# Patient Record
Sex: MALE | Race: WHITE | Employment: FULL TIME | URBAN - METROPOLITAN AREA
[De-identification: names, ages, dates, MRNs, and addresses within clinical notes are randomized per-mention and may not be internally consistent; named-entity substitution may affect disease eponyms.]

---

## 2017-03-16 PROBLEM — H52.229 REGULAR ASTIGMATISM: Status: ACTIVE | Noted: 2017-03-06

## 2017-03-16 PROBLEM — H52.00 HYPERMETROPIA: Status: ACTIVE | Noted: 2017-03-06

## 2017-10-30 ENCOUNTER — OFFICE VISIT (OUTPATIENT)
Dept: INTERNAL MEDICINE CLINIC | Age: 27
End: 2017-10-30

## 2017-10-30 VITALS
DIASTOLIC BLOOD PRESSURE: 82 MMHG | OXYGEN SATURATION: 99 % | SYSTOLIC BLOOD PRESSURE: 122 MMHG | BODY MASS INDEX: 22.9 KG/M2 | HEIGHT: 69 IN | HEART RATE: 103 BPM | TEMPERATURE: 98.3 F | RESPIRATION RATE: 12 BRPM | WEIGHT: 154.6 LBS

## 2017-10-30 DIAGNOSIS — F41.9 ANXIETY: ICD-10-CM

## 2017-10-30 DIAGNOSIS — H81.90 VESTIBULAR DIZZINESS: Primary | ICD-10-CM

## 2017-10-30 DIAGNOSIS — R11.0 NAUSEA: ICD-10-CM

## 2017-10-30 RX ORDER — ALPRAZOLAM 0.5 MG/1
0.5 TABLET ORAL
Qty: 20 TAB | Refills: 0 | Status: SHIPPED | OUTPATIENT
Start: 2017-10-30 | End: 2018-05-11 | Stop reason: SDUPTHER

## 2017-10-30 RX ORDER — MECLIZINE HYDROCHLORIDE 25 MG/1
TABLET ORAL
COMMUNITY
End: 2018-02-07 | Stop reason: ALTCHOICE

## 2017-10-30 RX ORDER — ONDANSETRON 4 MG/1
4 TABLET, ORALLY DISINTEGRATING ORAL
Qty: 20 TAB | Refills: 0 | Status: SHIPPED | OUTPATIENT
Start: 2017-10-30 | End: 2018-02-07 | Stop reason: ALTCHOICE

## 2017-10-30 NOTE — MR AVS SNAPSHOT
Visit Information Date & Time Provider Department Dept. Phone Encounter #  
 10/30/2017 11:40 AM Samy Miranda NP [facility] [address] [id number] Upcoming Health Maintenance Date Due INFLUENZA AGE 9 TO ADULT 8/1/2017 DTaP/Tdap/Td series (2 - Td) 7/27/2020 Allergies as of 10/30/2017  Review Complete On: 10/30/2017 By: Samy Miranda NP No Known Allergies Current Immunizations  Reviewed on 7/26/2010 Name Date HIB Vaccine 9/23/1993, 1/19/1993, 1992 Hepatitis B Vaccine 11/6/2007, 11/5/1998, 4/6/1998 Influenza Vaccine 9/26/2016, 11/11/2015 MMR Vaccine 10/3/1996, 9/23/1993 Meningococcal Vaccine 7/17/2010 OPV 10/3/1996 TD Vaccine 10/3/1996, 1/19/1993, 1992, 1992 TDAP Vaccine 7/27/2010 Not reviewed this visit You Were Diagnosed With   
  
 Codes Comments Vestibular dizziness    -  Primary ICD-10-CM: L67 ICD-9-CM: 386. 9 Anxiety     ICD-10-CM: F41.9 ICD-9-CM: 300.00 Vitals BP Pulse Temp Resp Height(growth percentile) Weight(growth percentile) (!) 133/91 (BP 1 Location: Left arm, BP Patient Position: Sitting) (!) 103 98.3 °F (36.8 °C) (Oral) 12 5' 9\" (1.753 m) 154 lb 9.6 oz (70.1 kg) SpO2 BMI Smoking Status 99% 22.83 kg/m2 Former Smoker BMI and BSA Data Body Mass Index Body Surface Area  
 22.83 kg/m 2 1.85 m 2 Preferred Pharmacy Pharmacy Name Phone [id number] [address] [facility], [address] [place] [address]  [phone] Your Updated Medication List  
  
   
This list is accurate as of: 10/30/17 12:20 PM.  Always use your most recent med list.  
  
  
  
  
 * ALPRAZolam 0.5 mg tablet Commonly known as:  Marito Pandya 1/2 TO 1 PILL ONCE A DAY BY MOUTH AS NEEDED FOR ANXIETY. NO ALCOHOL WITH THIS MEDICATION. * ALPRAZolam 0.5 mg tablet Commonly known as:  Dillon Ly Take 1 Tab by mouth three (3) times daily as needed for Anxiety. Max Daily Amount: 1.5 mg. Azelastine 0.15 % (205.5 mcg) nasal spray Commonly known as:  ASTEPRO  
2 Sprays by Both Nostrils route two (2) times a day. fluticasone 50 mcg/actuation nasal spray Commonly known as:  Euna Tomer 2 Sprays by Both Nostrils route daily. meclizine 25 mg tablet Commonly known as:  ANTIVERT Take  by mouth three (3) times daily as needed. ondansetron 4 mg disintegrating tablet Commonly known as:  ZOFRAN ODT Take 1 Tab by mouth every eight (8) hours as needed for Nausea. raNITIdine 300 mg tablet Commonly known as:  ZANTAC Take 1 Tab by mouth daily. For stomach acid * Notice: This list has 2 medication(s) that are the same as other medications prescribed for you. Read the directions carefully, and ask your doctor or other care provider to review them with you. Prescriptions Printed Refills ALPRAZolam (XANAX) 0.5 mg tablet 0 Sig: Take 1 Tab by mouth three (3) times daily as needed for Anxiety. Max Daily Amount: 1.5 mg.  
 Class: Print Route: Oral  
 ondansetron (ZOFRAN ODT) 4 mg disintegrating tablet 0 Sig: Take 1 Tab by mouth every eight (8) hours as needed for Nausea. Class: Print Route: Oral  
  
Patient Instructions Vertigo: Care Instructions Your Care Instructions Vertigo is the feeling that you or your surroundings are moving when there is no actual movement. It is often described as a feeling of spinning, whirling, falling, or tilting. Vertigo may make you vomit or feel nauseated. You may have trouble standing or walking and may lose your balance. Vertigo is often related to an inner ear problem, but it can have other more serious causes. If vertigo continues, you may need more tests to find its cause. Follow-up care is a key part of your treatment and safety.  Be sure to make and go to all appointments, and call your doctor if you are having problems. It's also a good idea to know your test results and keep a list of the medicines you take. How can you care for yourself at home? · Do not lie flat on your back. Prop yourself up slightly. This may reduce the spinning feeling. Keep your eyes open. · Move slowly so that you do not fall. · If your doctor recommends medicine, take it exactly as directed. · Do not drive while you are having vertigo. Certain exercises, called Templeton-Daroff exercises, can help decrease vertigo. To do Templeton-Daroff exercises: · Sit on the edge of a bed or sofa and quickly lie down on the side that causes the worst vertigo. Lie on your side with your ear down. · Stay in this position for at least 30 seconds or until the vertigo goes away. · Sit up. If this causes vertigo, wait for it to stop. · Repeat the procedure on the other side. · Repeat this 10 times. Do these exercises 2 times a day until the vertigo is gone. When should you call for help? Call 911 anytime you think you may need emergency care. For example, call if: 
? · You passed out (lost consciousness). ? · You have symptoms of a stroke. These may include: 
¨ Sudden numbness, tingling, weakness, or loss of movement in your face, arm, or leg, especially on only one side of your body. ¨ Sudden vision changes. ¨ Sudden trouble speaking. ¨ Sudden confusion or trouble understanding simple statements. ¨ Sudden problems with walking or balance. ¨ A sudden, severe headache that is different from past headaches. ?Call your doctor now or seek immediate medical care if: 
? · Vertigo occurs with a fever, a headache, or ringing in your ears. ? · You have new or increased nausea and vomiting. ? Watch closely for changes in your health, and be sure to contact your doctor if: 
? · Vertigo gets worse or happens more often. ? · Vertigo has not gotten better after 2 weeks. Where can you learn more? Go to http://charitoStirplate.iohan.info/. Enter H490 in the search box to learn more about \"Vertigo: Care Instructions. \" Current as of: May 12, 2017 Content Version: 11.4 © 2006-2017 [employer]. Care instructions adapted under license by [employer] (which disclaims liability or warranty for this information). If you have questions about a medical condition or this instruction, always ask your healthcare professional. [address] any warranty or liability for your use of this information. Vertigo: Exercises Your Care Instructions Here are some examples of typical rehabilitation exercises for your condition. Start each exercise slowly. Ease off the exercise if you start to have pain. Your doctor or physical therapist will tell you when you can start these exercises and which ones will work best for you. How to do the exercises Exercise 1 1. Stand with a chair in front of you and a wall behind you. If you begin to fall, you may use them for support. 2. Stand with your feet together and your arms at your sides. 3. Move your head up and down 10 times. Exercise 2 1. Move your head side to side 10 times. Exercise 3 1. Move your head diagonally up and down 10 times. Exercise 4 1. Move your head diagonally up and down 10 times on the other side. Follow-up care is a key part of your treatment and safety. Be sure to make and go to all appointments, and call your doctor if you are having problems. It's also a good idea to know your test results and keep a list of the medicines you take. Where can you learn more? Go to http://charitoStirplate.iohan.info/. Enter F349 in the search box to learn more about \"Vertigo: Exercises. \" Current as of: May 4, 2017 Content Version: 11.4 © 2006-2017 [employer].  Care instructions adapted under license by [address] (which disclaims liability or warranty for this information). If you have questions about a medical condition or this instruction, always ask your healthcare professional. [address] any warranty or liability for your use of this information. Introducing [facility]! Aly Vázquez introduces [facility] patient portal. Now you can access parts of your medical record, email your doctor's office, and request medication refills online. 1. In your internet browser, go to [email or web address] 2. Click on the First Time User? Click Here link in the Sign In box. You will see the New Member Sign Up page. 3. Enter your [facility] Access Code exactly as it appears below. You will not need to use this code after youve completed the sign-up process. If you do not sign up before the expiration date, you must request a new code. · [facility] Access Code: [email or web address] Expires: 1/28/2018 12:20 PM 
 
4. Enter the last four digits of your Social Security Number (xxxx) and Date of Birth (mm/dd/yyyy) as indicated and click Submit. You will be taken to the next sign-up page. 5. Create a [facility] ID. This will be your [facility] login ID and cannot be changed, so think of one that is secure and easy to remember. 6. Create a [facility] password. You can change your password at any time. 7. Enter your Password Reset Question and Answer. This can be used at a later time if you forget your password. 8. Enter your e-mail address. You will receive e-mail notification when new information is available in [address] 9. Click Sign Up. You can now view and download portions of your medical record. 10. Click the Download Summary menu link to download a portable copy of your medical information. If you have questions, please visit the Frequently Asked Questions section of the [facility] website.  Remember, [facility] is NOT to be used for urgent needs. For medical emergencies, dial 911. Now available from your iPhone and Android! Please provide this summary of care documentation to your next provider. Your primary care clinician is listed as Gulshan Mitchell. If you have any questions after today's visit, please call [phone].

## 2017-10-30 NOTE — PROGRESS NOTES
HISTORY OF PRESENT ILLNESS  Abdelrahman Chaidez is a 24 y.o. male. HPI  Presents with complaints of sudden onset of vertigo symptoms that began on 10/25 and significantly worsened over the next 48 hours to where he was vomiting on 10/27 and went to [facility] on 10/28 for same symptoms. Was diagnosed with vertigo and given Rx for Meclizine 25 mg to take once daily; has been taking this but continues to have some issues with vertigo and nausea and this is causing him to feel very anxious. Has not been driving since symptoms began. His girlfriend has brought him here today. Reports previous history of anxiety that seemed to be triggered by nausea and used Xanax in past which helped. Last refilled in 1/2016. Denies recent URI or illness.  profile was accessed online for Abdelrahman Chaidez and reviewed by me during this encounter. I did not see evidence of inappropriate or suspicious controlled substance prescription activity. Review of Systems   Constitutional: Negative for chills and fever. HENT: Negative for congestion, ear pain, sore throat and tinnitus. Respiratory: Negative for cough and shortness of breath. Cardiovascular: Negative for chest pain and palpitations. Gastrointestinal: Positive for nausea and vomiting. Negative for abdominal pain, constipation and diarrhea. Genitourinary: Negative for dysuria and frequency. Musculoskeletal: Negative for myalgias. Skin: Negative for rash. Neurological: Positive for dizziness. Negative for headaches. /82 (BP 1 Location: Left arm, BP Patient Position: Sitting)  Pulse (!) 103  Temp 98.3 °F (36.8 °C) (Oral)   Resp 12  Ht 5' 9\" (1.753 m)  Wt 154 lb 9.6 oz (70.1 kg)  SpO2 99%  BMI 22.83 kg/m2  Physical Exam   Constitutional: He is oriented to person, place, and time. He appears well-developed and well-nourished. HENT:   Head: Normocephalic and atraumatic.    Right Ear: External ear normal.   Left Ear: External ear normal. Nose: Nose normal.   Mouth/Throat: Oropharynx is clear and moist.   Eyes: Conjunctivae and EOM are normal. Pupils are equal, round, and reactive to light. Neck: Normal range of motion. Neck supple. No thyromegaly present. Cardiovascular: Normal rate and regular rhythm. Pulmonary/Chest: Effort normal and breath sounds normal.   Abdominal: Soft. Bowel sounds are normal. There is no tenderness. Musculoskeletal: Normal range of motion. Lymphadenopathy:     He has no cervical adenopathy. Neurological: He is alert and oriented to person, place, and time. No cranial nerve deficit. Coordination abnormal.   Abnormal Romberg   Psychiatric: His mood appears anxious. His speech is rapid and/or pressured. Nursing note and vitals reviewed. ASSESSMENT and PLAN  Diagnoses and all orders for this visit:    1. Vestibular dizziness -- self-limited course of illness discussed with patient; exercises given; can take Meclizine up to every 8 hours as needed. Cautioned regarding sedation. 2. Nausea  -     ondansetron (ZOFRAN ODT) 4 mg disintegrating tablet; Take 1 Tab by mouth every eight (8) hours as needed for Nausea. 3. Anxiety - cautioned regarding sedation with medication. Return to office if not improving.  -     ALPRAZolam (XANAX) 0.5 mg tablet; Take 1 Tab by mouth three (3) times daily as needed for Anxiety.  Max Daily Amount: 1.5 mg.      lab results and schedule of future lab studies reviewed with patient  reviewed diet, exercise and weight control  reviewed medications and side effects in detail

## 2017-10-30 NOTE — PATIENT INSTRUCTIONS
Vertigo: Care Instructions  Your Care Instructions    Vertigo is the feeling that you or your surroundings are moving when there is no actual movement. It is often described as a feeling of spinning, whirling, falling, or tilting. Vertigo may make you vomit or feel nauseated. You may have trouble standing or walking and may lose your balance. Vertigo is often related to an inner ear problem, but it can have other more serious causes. If vertigo continues, you may need more tests to find its cause. Follow-up care is a key part of your treatment and safety. Be sure to make and go to all appointments, and call your doctor if you are having problems. It's also a good idea to know your test results and keep a list of the medicines you take. How can you care for yourself at home? · Do not lie flat on your back. Prop yourself up slightly. This may reduce the spinning feeling. Keep your eyes open. · Move slowly so that you do not fall. · If your doctor recommends medicine, take it exactly as directed. · Do not drive while you are having vertigo. Certain exercises, called Templeton-Daroff exercises, can help decrease vertigo. To do Templeton-Daroff exercises:  · Sit on the edge of a bed or sofa and quickly lie down on the side that causes the worst vertigo. Lie on your side with your ear down. · Stay in this position for at least 30 seconds or until the vertigo goes away. · Sit up. If this causes vertigo, wait for it to stop. · Repeat the procedure on the other side. · Repeat this 10 times. Do these exercises 2 times a day until the vertigo is gone. When should you call for help? Call 911 anytime you think you may need emergency care. For example, call if:  ? · You passed out (lost consciousness). ? · You have symptoms of a stroke. These may include:  ¨ Sudden numbness, tingling, weakness, or loss of movement in your face, arm, or leg, especially on only one side of your body.   ¨ Sudden vision changes. ¨ Sudden trouble speaking. ¨ Sudden confusion or trouble understanding simple statements. ¨ Sudden problems with walking or balance. ¨ A sudden, severe headache that is different from past headaches. ?Call your doctor now or seek immediate medical care if:  ? · Vertigo occurs with a fever, a headache, or ringing in your ears. ? · You have new or increased nausea and vomiting. ? Watch closely for changes in your health, and be sure to contact your doctor if:  ? · Vertigo gets worse or happens more often. ? · Vertigo has not gotten better after 2 weeks. Where can you learn more? Go to http://charito-han.info/. Enter M668 in the search box to learn more about \"Vertigo: Care Instructions. \"  Current as of: May 12, 2017  Content Version: 11.4  © 3931-8965 [employer]. Care instructions adapted under license by "[employer]" (which disclaims liability or warranty for this information). If you have questions about a medical condition or this instruction, always ask your healthcare professional. [place] [address] any warranty or liability for your use of this information. Vertigo: Exercises  Your Care Instructions  Here are some examples of typical rehabilitation exercises for your condition. Start each exercise slowly. Ease off the exercise if you start to have pain. Your doctor or physical therapist will tell you when you can start these exercises and which ones will work best for you. How to do the exercises  Exercise 1    1. Stand with a chair in front of you and a wall behind you. If you begin to fall, you may use them for support. 2. Stand with your feet together and your arms at your sides. 3. Move your head up and down 10 times. Exercise 2    1. Move your head side to side 10 times. Exercise 3    1. Move your head diagonally up and down 10 times. Exercise 4    1.  Move your head diagonally up and down 10 times on the other side.  Follow-up care is a key part of your treatment and safety. Be sure to make and go to all appointments, and call your doctor if you are having problems. It's also a good idea to know your test results and keep a list of the medicines you take. Where can you learn more? Go to http://charito-han.info/. Enter F349 in the search box to learn more about \"Vertigo: Exercises. \"  Current as of: May 4, 2017  Content Version: 11.4  © 2196-9842 [employer]. Care instructions adapted under license by [employer] (which disclaims liability or warranty for this information). If you have questions about a medical condition or this instruction, always ask your healthcare professional. [address] any warranty or liability for your use of this information.

## 2018-02-07 ENCOUNTER — OFFICE VISIT (OUTPATIENT)
Dept: INTERNAL MEDICINE CLINIC | Age: 28
End: 2018-02-07

## 2018-02-07 VITALS
HEIGHT: 69 IN | SYSTOLIC BLOOD PRESSURE: 127 MMHG | DIASTOLIC BLOOD PRESSURE: 90 MMHG | RESPIRATION RATE: 12 BRPM | TEMPERATURE: 98.5 F | HEART RATE: 120 BPM | WEIGHT: 155 LBS | BODY MASS INDEX: 22.96 KG/M2

## 2018-02-07 DIAGNOSIS — H81.90 VESTIBULAR DIZZINESS: ICD-10-CM

## 2018-02-07 DIAGNOSIS — H69.92 EUSTACHIAN TUBE DISORDER, LEFT: Primary | ICD-10-CM

## 2018-02-07 PROBLEM — H18.609 KERATOCONUS: Status: ACTIVE | Noted: 2018-01-01

## 2018-02-07 RX ORDER — METHYLPREDNISOLONE 4 MG/1
TABLET ORAL
Qty: 1 DOSE PACK | Refills: 0 | Status: SHIPPED | OUTPATIENT
Start: 2018-02-07 | End: 2018-02-17

## 2018-02-07 RX ORDER — MECLIZINE HYDROCHLORIDE 25 MG/1
25 TABLET ORAL
Qty: 30 TAB | Refills: 0 | Status: SHIPPED | OUTPATIENT
Start: 2018-02-07 | End: 2019-12-03 | Stop reason: ALTCHOICE

## 2018-02-07 NOTE — PROGRESS NOTES
HISTORY OF PRESENT ILLNESS    Chief Complaint   Patient presents with    Ear Fullness       Patient states he feels unsteady and his left ear is clogged for about 10 days. Reports head cold 14 days ago which improved after standing. No pain in ear. .  Did see Celedanna Reynolds in Oct 2017 and was given flonase, meclizine for vestibular dizziness. He took meclizine and s/s improved in 2 weeks then  Now, reports dizziness when inside, but not outside  Hears crackles in left ear when he blow his nose now  Able to run. Had a really stiff neck when he woke up along w his congestion 14 days ago  Went to [place].  States imaging was NL. This is resolved. 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 has a past surgical history that includes hx tonsillectomy. reports that he has quit smoking. He has never used smokeless tobacco.  family history is not on file. Physical Exam   Nursing note and vitals reviewed. Blood pressure 127/90, pulse (!) 120, temperature 98.5 °F (36.9 °C), temperature source Oral, resp. rate 12, height 5' 9\" (1.753 m), weight 155 lb (70.3 kg). Constitutional:  No distress. Eyes: Conjunctivae are normal.   Ears:  Hearing grossly intact  Cardiovascular: Normal rate. regular rhythm, no murmurs or gallops  No edema  Pulmonary/Chest: Effort normal.   CTAB  Musculoskeletal: moves all 4 extremities   Neurological: Alert and oriented to person, place, and time. Skin: No rash noted. Psychiatric: Normal mood and affect. Behavior is normal.     ASSESSMENT and PLAN  Diagnoses and all orders for this visit:    1.  Eustachian tube disorder, left  -     methylPREDNISolone (MEDROL DOSEPACK) 4 mg tablet; USE AS DIRECTED ON PACKAGE  -     meclizine (ANTIVERT) 25 mg tablet; Take 1 Tab by mouth three (3) times daily as needed. The patient is reassured that these symptoms do not appear to represent a serious or threatening condition. Cont flonase  Can add allegra or claritin    2. Vestibular dizziness  -     meclizine (ANTIVERT) 25 mg tablet; Take 1 Tab by mouth three (3) times daily as needed. lab results and schedule of future lab studies reviewed with patient  reviewed medications and side effects in detail    Return to clinic for further evaluation if new symptoms develop    Follow-up Disposition: Not on File    Current Outpatient Prescriptions   Medication Sig    methylPREDNISolone (MEDROL DOSEPACK) 4 mg tablet USE AS DIRECTED ON PACKAGE    meclizine (ANTIVERT) 25 mg tablet Take 1 Tab by mouth three (3) times daily as needed.  ALPRAZolam (XANAX) 0.5 mg tablet Take 1 Tab by mouth three (3) times daily as needed for Anxiety. Max Daily Amount: 1.5 mg.    fluticasone (FLONASE) 50 mcg/actuation nasal spray 2 Sprays by Both Nostrils route daily. No current facility-administered medications for this visit.

## 2018-02-07 NOTE — MR AVS SNAPSHOT
[address] [place] [address] 
 
 
 [address] [place] [address] [place] [address] [facility] 
[phone] Patient: Celsa Delatorre MRN: [id number] Visit Information Date & Time Provider Department Dept. Phone Encounter #  
 2/7/2018  8:30 AM Preethi Escudero MD [facility] [facility] [id number] Upcoming Health Maintenance Date Due DTaP/Tdap/Td series (2 - Td) 7/27/2020 Allergies as of 2/7/2018  Review Complete On: 2/7/2018 By: Preethi Escudero MD  
 No Known Allergies Current Immunizations  Reviewed on 7/26/2010 Name Date HIB Vaccine 9/23/1993, 1/19/1993, 1992 Hepatitis B Vaccine 11/6/2007, 11/5/1998, 4/6/1998 Influenza Vaccine 9/26/2016, 11/11/2015 MMR Vaccine 10/3/1996, 9/23/1993 Meningococcal Vaccine 7/17/2010 OPV 10/3/1996 TD Vaccine 10/3/1996, 1/19/1993, 1992, 1992 TDAP Vaccine 7/27/2010 Not reviewed this visit You Were Diagnosed With   
  
 Codes Comments Eustachian tube disorder, left    -  Primary ICD-10-CM: H69.92 
ICD-9-CM: 381. 9 Vestibular dizziness     ICD-10-CM: F99 ICD-9-CM: 386. 9 Vitals BP Pulse Temp Resp Height(growth percentile) Weight(growth percentile) 127/90 (BP 1 Location: Left arm, BP Patient Position: Sitting) (!) 120 98.5 °F (36.9 °C) (Oral) 12 5' 9\" (1.753 m) 155 lb (70.3 kg) BMI Smoking Status 22.89 kg/m2 Former Smoker Vitals History BMI and BSA Data Body Mass Index Body Surface Area  
 22.89 kg/m 2 1.85 m 2 Preferred Pharmacy Pharmacy Name Phone [id number] [address], [address] [place] [address]  [phone] Your Updated Medication List  
  
   
This list is accurate as of: 2/7/18  9:16 AM.  Always use your most recent med list.  
  
  
  
  
 ALPRAZolam 0.5 mg tablet Commonly known as:  Lugenia Mohs Take 1 Tab by mouth three (3) times daily as needed for Anxiety.  Max Daily Amount: 1.5 mg.  
 fluticasone 50 mcg/actuation nasal spray Commonly known as:  Diane Finders 2 Sprays by Both Nostrils route daily. meclizine 25 mg tablet Commonly known as:  ANTIVERT Take 1 Tab by mouth three (3) times daily as needed. methylPREDNISolone 4 mg tablet Commonly known as:  Nash Sweet USE AS DIRECTED ON PACKAGE Prescriptions Sent to Pharmacy Refills  
 methylPREDNISolone (MEDROL DOSEPACK) 4 mg tablet 0 Sig: USE AS DIRECTED ON PACKAGE Class: Normal  
 Pharmacy: [facility]/pharmacy #[id number] Ph #: [phone]  
 meclizine (ANTIVERT) 25 mg tablet 0 Sig: Take 1 Tab by mouth three (3) times daily as needed. Class: Normal  
 Pharmacy: [facility]/pharmacy #[id number]- [address] Ph #: [phone] Route: Oral  
  
Patient Instructions Eustachian Tube Problems: Care Instructions Your Care Instructions The eustachian (say \"you-STAY-shee-un\") tubes run between the inside of the ears and the throat. They keep air pressure stable in the ears. If your eustachian tubes become blocked, the air pressure in your ears changes. The fluids from a cold can clog eustachian tubes, causing pain in the ears. A quick change in air pressure can cause eustachian tubes to close up. This might happen when an airplane changes altitude or when a  goes up or down underwater. Eustachian tube problems often clear up on their own or after antibiotic treatment. If your tubes continue to be blocked, you may need surgery. Follow-up care is a key part of your treatment and safety. Be sure to make and go to all appointments, and call your doctor if you are having problems. It's also a good idea to know your test results and keep a list of the medicines you take. How can you care for yourself at home? · To ease ear pain, apply a warm washcloth or a heating pad set on low. There may be some drainage from the ear when the heat melts earwax. Put a cloth between the heat source and your skin. Do not use a heating pad with children. · If your doctor prescribed antibiotics, take them as directed. Do not stop taking them just because you feel better. You need to take the full course of antibiotics. · Your doctor may recommend over-the-counter medicine. Be safe with medicines. Oral or nasal decongestants may relieve ear pain. Avoid decongestants that are combined with antihistamines, which tend to cause more blockage. But if allergies seem to be the problem, your doctor may recommend a combination. Be careful with cough and cold medicines. Don't give them to children younger than 6, because they don't work for children that age and can even be harmful. For children 6 and older, always follow all the instructions carefully. Make sure you know how much medicine to give and how long to use it. And use the dosing device if one is included. When should you call for help? Call your doctor now or seek immediate medical care if: 
? · You develop sudden, complete hearing loss. ? · You have severe pain or feel dizzy. ? · You have new or increasing pus or blood draining from your ear. ? · You have redness, swelling, or pain around or behind the ear. ? Watch closely for changes in your health, and be sure to contact your doctor if: 
? · You do not get better after 2 weeks. ? · You have any new symptoms, such as itching or a feeling of fullness in the ear. Where can you learn more? Go to http://charito-han.info/. Enter Y822 in the search box to learn more about \"Eustachian Tube Problems: Care Instructions. \" Current as of: May 12, 2017 Content Version: 11.4 © 7421-7031 PixelFish.  Care instructions adapted under license by [employer] (which disclaims liability or warranty for this information). If you have questions about a medical condition or this instruction, always ask your healthcare professional. [address] any warranty or liability for your use of this information. Introducing [facility] SERVICES! Missael Choi introduces [employer] patient portal. Now you can access parts of your medical record, email your doctor's office, and request medication refills online. 1. In your internet browser, go to [email or web address] 2. Click on the First Time User? Click Here link in the Sign In box. You will see the New Member Sign Up page. 3. Enter your [employer] Access Code exactly as it appears below. You will not need to use this code after youve completed the sign-up process. If you do not sign up before the expiration date, you must request a new code. · [employer] Access Code: [email or web address] Expires: 5/8/2018  9:16 AM 
 
4. Enter the last four digits of your Social Security Number (xxxx) and Date of Birth (mm/dd/yyyy) as indicated and click Submit. You will be taken to the next sign-up page. 5. Create a [employer] ID. This will be your [employer] login ID and cannot be changed, so think of one that is secure and easy to remember. 6. Create a [employer] password. You can change your password at any time. 7. Enter your Password Reset Question and Answer. This can be used at a later time if you forget your password. 8. Enter your e-mail address. You will receive e-mail notification when new information is available in [address] 9. Click Sign Up. You can now view and download portions of your medical record. 10. Click the Download Summary menu link to download a portable copy of your medical information. If you have questions, please visit the Frequently Asked Questions section of the [employer] website. Remember, [employer] is NOT to be used for urgent needs. For medical emergencies, dial 911. Now available from your iPhone and Android! Please provide this summary of care documentation to your next provider. Your primary care clinician is listed as Wen Vitale. If you have any questions after today's visit, please call [phone].

## 2018-02-07 NOTE — PROGRESS NOTES
Patient states he feels unsteady and his left ear is clogged. No pain. Did see Davonte Ruiz in Oct.    Had a really stiff neck 2 weeks ago. Went to [facility].  States imaging was NL.

## 2018-04-25 ENCOUNTER — OFFICE VISIT (OUTPATIENT)
Dept: INTERNAL MEDICINE CLINIC | Age: 28
End: 2018-04-25

## 2018-04-25 VITALS
DIASTOLIC BLOOD PRESSURE: 80 MMHG | WEIGHT: 158 LBS | HEART RATE: 84 BPM | SYSTOLIC BLOOD PRESSURE: 128 MMHG | OXYGEN SATURATION: 98 % | RESPIRATION RATE: 16 BRPM | TEMPERATURE: 98.4 F | HEIGHT: 69 IN | BODY MASS INDEX: 23.4 KG/M2

## 2018-04-25 DIAGNOSIS — H69.82 DYSFUNCTION OF LEFT EUSTACHIAN TUBE: ICD-10-CM

## 2018-04-25 DIAGNOSIS — Z11.3 SCREENING FOR STD (SEXUALLY TRANSMITTED DISEASE): ICD-10-CM

## 2018-04-25 DIAGNOSIS — Z00.00 PREVENTATIVE HEALTH CARE: Primary | ICD-10-CM

## 2018-04-25 NOTE — PROGRESS NOTES
Lokesh Wolf is a 34 y.o. male    Chief Complaint   Patient presents with    Complete Physical       1. Have you been to the ER, urgent care clinic since your last visit? Hospitalized since your last visit? No      2. Have you seen or consulted any other health care providers outside of the [address] [place] [address] since your last visit? Include any pap smears or colon screening.   Yes, An ENT doctor for his [facility]     Visit Vitals    BP (!) 137/93 (BP 1 Location: Left arm, BP Patient Position: Sitting)    Pulse 84    Temp 98.4 °F (36.9 °C) (Oral)    Resp 16    Ht 5' 9\" (1.753 m)    Wt 158 lb (71.7 kg)    SpO2 98%    BMI 23.33 kg/m2

## 2018-04-25 NOTE — PROGRESS NOTES
Charlotte Ash is a 34 y.o. male  Presenting for his annual checkup and health maintenance review and follow-up  Reports ongoing intermittent left ear, sensation of fullness associated with mild vertigo, popping when he swallows. Antihistamines, steroid pack, flonase did not help in February. saw Dr. Tahmina Jasso in ENT and was told he could have a tube placed. Patient is flying in a couple months and would like to consider having this done sooner. Exercise: very active  Sexually active with the same girlfriend for 2 years. She had recent GYN exam and STD screening which is negative. Patient denies penile discharge, rash. No prior history of STD. He does have 3 other partners in the past.  States that he wore a condom every time. Diet: generally follows a low fat low cholesterol diet  Health Maintenance   Topic Date Due    DTaP/Tdap/Td series (2 - Td) 07/27/2020    Influenza Age 5 to Adult  Addressed     Health Maintenance reviewed  Last digital rectal exam:  none  No results found for: PSA, PSA2, PSAR1, Arliss Mart, PSAR3, [id number], [id number], PSALT    Vaccinations reviewed  Immunization History   Administered Date(s) Administered    HIB Vaccine 1992, 01/19/1993, 09/23/1993    Hepatitis B Vaccine 04/06/1998, 11/05/1998, 11/06/2007    Influenza Vaccine 11/11/2015, 09/26/2016    MMR Vaccine 09/23/1993, 10/03/1996    Meningococcal Vaccine 07/17/2010    OPV 10/03/1996    TD Vaccine 1992, 1992, 01/19/1993, 10/03/1996    TDAP Vaccine 07/27/2010       Past Medical History:   Diagnosis Date    Ganglion cyst of flexor tendon sheath 5/7/2012    left 4th MCP    Keratoconus 2018    Mononucleosis 1/2011    Seasonal allergic rhinitis       has a past surgical history that includes hx tonsillectomy. Review of patient's allergies indicates no known allergies.    Current Outpatient Prescriptions   Medication Sig    meclizine (ANTIVERT) 25 mg tablet Take 1 Tab by mouth three (3) times daily as needed.  fluticasone (FLONASE) 50 mcg/actuation nasal spray 2 Sprays by Both Nostrils route daily.  ALPRAZolam (XANAX) 0.5 mg tablet Take 1 Tab by mouth three (3) times daily as needed for Anxiety. Max Daily Amount: 1.5 mg. No current facility-administered medications for this visit. SOCIAL HX:  reports that he has quit smoking. He has never used smokeless tobacco.    FAMILY HX: family history is not on file. Review of Systems - History obtained from the patient  General ROS: negative for - night sweats, weight gain or weight loss  Cardiovascular ROS: no chest pain, dyspnea on exertion, edema    Physical exam  Blood pressure 128/80, pulse 84, temperature 98.4 °F (36.9 °C), temperature source Oral, resp. rate 16, height 5' 9\" (1.753 m), weight 158 lb (71.7 kg), SpO2 98 %. Wt Readings from Last 3 Encounters:   04/25/18 158 lb (71.7 kg)   02/07/18 155 lb (70.3 kg)   10/30/17 154 lb 9.6 oz (70.1 kg)     He appears well, alert and oriented x 3, pleasant and cooperative. Vitals as noted. Left TM with perhaps mild sclerosis. No swelling. No rashes or significant lesions. Neck supple and free of adenopathy, or masses. No thyromegaly or carotid bruits. Cranial nerves normal. Lungs are clear to auscultation. Heart sounds are normal with no murmurs, clicks, gallops or rubs. Abdomen is soft, non- tender, with no masses or organomegaly. Extremities, peripheral pulses and reflexes are normal.  . RECTAL/PROSTATE EXAM: not done. Skin is without rashes or suspicious lesions. Assessment and Plan:    1. Preventative health care  - CBC W/O DIFF  - LIPID PANEL  - METABOLIC PANEL, COMPREHENSIVE    2. Dysfunction of left eustachian tube  Follow-up with Dr. Malena Whitaker. May benefit from tympanostomy. This is a bothersome symptom. 3. Screening for STD (sexually transmitted disease)  Asymptomatic.   Somewhat low risk.  - HIV 1/2 AG/AB, 4TH GENERATION,W RFLX CONFIRM  - RPR W/REFLEX TITER AND CONFIRMATION      The patient is asked to make an attempt to improve diet and exercise patterns  Avoid tobacco products, excessive alcohol    Return for yearly wellness visits

## 2018-04-25 NOTE — MR AVS SNAPSHOT
[address] [place] [address] 
 
 
 [address] [place] [address] 
[phone] Patient: Palmira Benjamin MRN: [id number] [id number] Visit Information Date & Time Provider Department Dept. Phone Encounter #  
 4/25/2018  2:45 PM Miko Castañeda MD [facility] [address] [facility] [id number] Upcoming Health Maintenance Date Due DTaP/Tdap/Td series (2 - Td) 7/27/2020 Allergies as of 4/25/2018  Review Complete On: 4/25/2018 By: Miko Castañeda MD  
 No Known Allergies Current Immunizations  Reviewed on 4/25/2018 Name Date HIB Vaccine 9/23/1993, 1/19/1993, 1992 Hepatitis B Vaccine 11/6/2007, 11/5/1998, 4/6/1998 Influenza Vaccine 9/26/2016, 11/11/2015 MMR Vaccine 10/3/1996, 9/23/1993 Meningococcal Vaccine 7/17/2010 OPV 10/3/1996 TD Vaccine 10/3/1996, 1/19/1993, 1992, 1992 TDAP Vaccine 7/27/2010 Reviewed by Miko Castañeda MD on 4/25/2018 at  3:06 PM  
 Reviewed by Miko Castañeda MD on 4/25/2018 at  3:09 PM  
You Were Diagnosed With   
  
 Codes Comments Preventative health care    -  Primary ICD-10-CM: Z00.00 ICD-9-CM: V70.0 Dysfunction of left eustachian tube     ICD-10-CM: W03.00 ICD-9-CM: 381.81 Screening for STD (sexually transmitted disease)     ICD-10-CM: Z11.3 ICD-9-CM: V74.5 Vitals BP Pulse Temp Resp Height(growth percentile) Weight(growth percentile) 128/80 (BP 1 Location: Left arm, BP Patient Position: Sitting) 84 98.4 °F (36.9 °C) (Oral) 16 5' 9\" (1.753 m) 158 lb (71.7 kg) SpO2 BMI Smoking Status 98% 23.33 kg/m2 Former Smoker Vitals History BMI and BSA Data Body Mass Index Body Surface Area  
 23.33 kg/m 2 1.87 m 2 Preferred Pharmacy Pharmacy Name Phone [id number] [address], [address] [place] [address]  [phone] Your Updated Medication List  
  
   
 This list is accurate as of 4/25/18  3:19 PM.  Always use your most recent med list.  
  
  
  
  
 ALPRAZolam 0.5 mg tablet Commonly known as:  Rosalene Errol Take 1 Tab by mouth three (3) times daily as needed for Anxiety. Max Daily Amount: 1.5 mg.  
  
 fluticasone 50 mcg/actuation nasal spray Commonly known as:  Celestino Bray 2 Sprays by Both Nostrils route daily. meclizine 25 mg tablet Commonly known as:  ANTIVERT Take 1 Tab by mouth three (3) times daily as needed. We Performed the Following CBC W/O DIFF [45978 CPT(R)] HIV 1/2 AG/AB, 4TH GENERATION,W RFLX CONFIRM [id number] CPT(R)] LIPID PANEL [60741 CPT(R)] METABOLIC PANEL, COMPREHENSIVE [83082 CPT(R)] RPR W/REFLEX TITER AND CONFIRMATION [[id number] Custom] Patient Instructions Well Visit, Ages 25 to 48: Care Instructions Your Care Instructions Physical exams can help you stay healthy. Your doctor has checked your overall health and may have suggested ways to take good care of yourself. He or she also may have recommended tests. At home, you can help prevent illness with healthy eating, regular exercise, and other steps. Follow-up care is a key part of your treatment and safety. Be sure to make and go to all appointments, and call your doctor if you are having problems. It's also a good idea to know your test results and keep a list of the medicines you take. How can you care for yourself at home? · Reach and stay at a healthy weight. This will lower your risk for many problems, such as obesity, diabetes, heart disease, and high blood pressure. · Get at least 30 minutes of physical activity on most days of the week. Walking is a good choice. You also may want to do other activities, such as running, swimming, cycling, or playing tennis or team sports. Discuss any changes in your exercise program with your doctor. · Do not smoke or allow others to smoke around you.  If you need help quitting, talk to your doctor about stop-smoking programs and medicines. These can increase your chances of quitting for good. · Talk to your doctor about whether you have any risk factors for sexually transmitted infections (STIs). Having one sex partner (who does not have STIs and does not have sex with anyone else) is a good way to avoid these infections. · Use birth control if you do not want to have children at this time. Talk with your doctor about the choices available and what might be best for you. · Protect your skin from too much sun. When you're outdoors from 10 a.m. to 4 p.m., stay in the shade or cover up with clothing and a hat with a wide brim. Wear sunglasses that block UV rays. Even when it's cloudy, put broad-spectrum sunscreen (SPF 30 or higher) on any exposed skin. · See a dentist one or two times a year for checkups and to have your teeth cleaned. · Wear a seat belt in the car. · Drink alcohol in moderation, if at all. That means no more than 2 drinks a day for men and 1 drink a day for women. Follow your doctor's advice about when to have certain tests. These tests can spot problems early. For everyone · Cholesterol. Have the fat (cholesterol) in your blood tested after age 21. Your doctor will tell you how often to have this done based on your age, family history, or other things that can increase your risk for heart disease. · Blood pressure. Have your blood pressure checked during a routine doctor visit. Your doctor will tell you how often to check your blood pressure based on your age, your blood pressure results, and other factors. · Vision. Talk with your doctor about how often to have a glaucoma test. 
· Diabetes. Ask your doctor whether you should have tests for diabetes. · Colon cancer. Have a test for colon cancer at age 48. You may have one of several tests.  If you are younger than 48, you may need a test earlier if you have any risk factors. Risk factors include whether you already had a precancerous polyp removed from your colon or whether your parent, brother, sister, or child has had colon cancer. For women · Breast exam and mammogram. Talk to your doctor about when you should have a clinical breast exam and a mammogram. Medical experts differ on whether and how often women under 50 should have these tests. Your doctor can help you decide what is right for you. · Pap test and pelvic exam. Begin Pap tests at age 24. A Pap test is the best way to find cervical cancer. The test often is part of a pelvic exam. Ask how often to have this test. 
· Tests for sexually transmitted infections (STIs). Ask whether you should have tests for STIs. You may be at risk if you have sex with more than one person, especially if your partners do not wear condoms. For men · Tests for sexually transmitted infections (STIs). Ask whether you should have tests for STIs. You may be at risk if you have sex with more than one person, especially if you do not wear a condom. · Testicular cancer exam. Ask your doctor whether you should check your testicles regularly. · Prostate exam. Talk to your doctor about whether you should have a blood test (called a PSA test) for prostate cancer. Experts differ on whether and when men should have this test. Some experts suggest it if you are older than 39 and are -American or have a father or brother who got prostate cancer when he was younger than 72. When should you call for help? Watch closely for changes in your health, and be sure to contact your doctor if you have any problems or symptoms that concern you. Where can you learn more? Go to http://charito-han.info/. Enter P072 in the search box to learn more about \"Well Visit, Ages 25 to 48: Care Instructions. \" Current as of: May 12, 2017 Content Version: 11.4 © 8336-4231 Healthwise, Incorporated. Care instructions adapted under license by [employer] (which disclaims liability or warranty for this information). If you have questions about a medical condition or this instruction, always ask your healthcare professional. [address] any warranty or liability for your use of this information. Introducing [facility]! Patrica Pollard introduces [employer] patient portal. Now you can access parts of your medical record, email your doctor's office, and request medication refills online. 1. In your internet browser, go to [email or web address] 2. Click on the First Time User? Click Here link in the Sign In box. You will see the New Member Sign Up page. 3. Enter your Philo Access Code exactly as it appears below. You will not need to use this code after youve completed the sign-up process. If you do not sign up before the expiration date, you must request a new code. · [employer] Access Code: [email or web address] Expires: 5/8/2018 10:16 AM 
 
4. Enter the last four digits of your Social Security Number (xxxx) and Date of Birth (mm/dd/yyyy) as indicated and click Submit. You will be taken to the next sign-up page. 5. Create a Philo ID. This will be your Philo login ID and cannot be changed, so think of one that is secure and easy to remember. 6. Create a Philo password. You can change your password at any time. 7. Enter your Password Reset Question and Answer. This can be used at a later time if you forget your password. 8. Enter your e-mail address. You will receive e-mail notification when new information is available in [address] 9. Click Sign Up. You can now view and download portions of your medical record. 10. Click the Download Summary menu link to download a portable copy of your medical information.  
 
If you have questions, please visit the Frequently Asked Questions section of the [email or web address]. Remember, Doctor kinetict is NOT to be used for urgent needs. For medical emergencies, dial 911. Now available from your iPhone and Android! Please provide this summary of care documentation to your next provider. Your primary care clinician is listed as Shar Smith. If you have any questions after today's visit, please call [phone].

## 2018-04-25 NOTE — PATIENT INSTRUCTIONS

## 2018-04-26 LAB
ALBUMIN SERPL-MCNC: 4.9 G/DL (ref 3.5–5.5)
ALBUMIN/GLOB SERPL: 2 {RATIO} (ref 1.2–2.2)
ALP SERPL-CCNC: 70 IU/L (ref 39–117)
ALT SERPL-CCNC: 24 IU/L (ref 0–44)
AST SERPL-CCNC: 17 IU/L (ref 0–40)
BILIRUB SERPL-MCNC: 0.3 MG/DL (ref 0–1.2)
BUN SERPL-MCNC: 13 MG/DL (ref 6–20)
BUN/CREAT SERPL: 13 (ref 9–20)
CALCIUM SERPL-MCNC: 9.4 MG/DL (ref 8.7–10.2)
CHLORIDE SERPL-SCNC: 100 MMOL/L (ref 96–106)
CHOLEST SERPL-MCNC: 183 MG/DL (ref 100–199)
CO2 SERPL-SCNC: 24 MMOL/L (ref 18–29)
CREAT SERPL-MCNC: 0.97 MG/DL (ref 0.76–1.27)
ERYTHROCYTE [DISTWIDTH] IN BLOOD BY AUTOMATED COUNT: 13.5 % (ref 12.3–15.4)
GFR SERPLBLD CREATININE-BSD FMLA CKD-EPI: 107 ML/MIN/1.73
GFR SERPLBLD CREATININE-BSD FMLA CKD-EPI: 124 ML/MIN/1.73
GLOBULIN SER CALC-MCNC: 2.4 G/DL (ref 1.5–4.5)
GLUCOSE SERPL-MCNC: 94 MG/DL (ref 65–99)
HCT VFR BLD AUTO: 42.3 % (ref 37.5–51)
HDLC SERPL-MCNC: 55 MG/DL
HGB BLD-MCNC: 14.1 G/DL (ref 13–17.7)
HIV 1+2 AB+HIV1 P24 AG SERPL QL IA: NON REACTIVE
INTERPRETATION, 910389: NORMAL
LDLC SERPL CALC-MCNC: 89 MG/DL (ref 0–99)
MCH RBC QN AUTO: 30.2 PG (ref 26.6–33)
MCHC RBC AUTO-ENTMCNC: 33.3 G/DL (ref 31.5–35.7)
MCV RBC AUTO: 91 FL (ref 79–97)
PLATELET # BLD AUTO: 252 X10E3/UL (ref 150–379)
POTASSIUM SERPL-SCNC: 4.2 MMOL/L (ref 3.5–5.2)
PROT SERPL-MCNC: 7.3 G/DL (ref 6–8.5)
RBC # BLD AUTO: 4.67 X10E6/UL (ref 4.14–5.8)
RPR SER QL: NON REACTIVE
SODIUM SERPL-SCNC: 141 MMOL/L (ref 134–144)
TRIGL SERPL-MCNC: 193 MG/DL (ref 0–149)
VLDLC SERPL CALC-MCNC: 39 MG/DL (ref 5–40)
WBC # BLD AUTO: 6.8 X10E3/UL (ref 3.4–10.8)

## 2018-05-10 ENCOUNTER — TELEPHONE (OUTPATIENT)
Dept: INTERNAL MEDICINE CLINIC | Age: 28
End: 2018-05-10

## 2018-05-11 DIAGNOSIS — F41.9 ANXIETY: ICD-10-CM

## 2018-05-11 RX ORDER — ALPRAZOLAM 0.5 MG/1
0.5 TABLET ORAL
Qty: 20 TAB | Refills: 0 | OUTPATIENT
Start: 2018-05-11

## 2018-05-25 ENCOUNTER — HOSPITAL ENCOUNTER (OUTPATIENT)
Dept: CT IMAGING | Age: 28
Discharge: HOME | End: 2018-05-25
Attending: SPECIALIST
Payer: COMMERCIAL

## 2018-05-25 DIAGNOSIS — H69.80 EUSTACHIAN TUBE DYSFUNCTION: ICD-10-CM

## 2018-05-25 PROCEDURE — 70480 CT ORBIT/EAR/FOSSA W/O DYE: CPT

## 2019-12-03 ENCOUNTER — OFFICE VISIT (OUTPATIENT)
Dept: INTERNAL MEDICINE CLINIC | Age: 29
End: 2019-12-03

## 2019-12-03 VITALS
SYSTOLIC BLOOD PRESSURE: 138 MMHG | RESPIRATION RATE: 12 BRPM | WEIGHT: 159 LBS | HEIGHT: 69 IN | TEMPERATURE: 98.5 F | OXYGEN SATURATION: 99 % | DIASTOLIC BLOOD PRESSURE: 84 MMHG | HEART RATE: 91 BPM | BODY MASS INDEX: 23.55 KG/M2

## 2019-12-03 DIAGNOSIS — J02.9 SORE THROAT: Primary | ICD-10-CM

## 2019-12-03 DIAGNOSIS — R49.0 HOARSENESS OF VOICE: ICD-10-CM

## 2019-12-03 DIAGNOSIS — R07.89 CHEST WALL PAIN: ICD-10-CM

## 2019-12-03 DIAGNOSIS — R03.0 ELEVATED BLOOD PRESSURE READING WITHOUT DIAGNOSIS OF HYPERTENSION: ICD-10-CM

## 2019-12-03 LAB
S PYO AG THROAT QL: NEGATIVE
VALID INTERNAL CONTROL?: YES

## 2019-12-03 RX ORDER — AZITHROMYCIN 250 MG/1
250 TABLET, FILM COATED ORAL SEE ADMIN INSTRUCTIONS
Qty: 6 TAB | Refills: 0 | Status: SHIPPED | OUTPATIENT
Start: 2019-12-03 | End: 2019-12-08

## 2019-12-03 RX ORDER — PREDNISONE 20 MG/1
TABLET ORAL
Qty: 8 TAB | Refills: 0 | Status: SHIPPED | OUTPATIENT
Start: 2019-12-03

## 2019-12-03 NOTE — PATIENT INSTRUCTIONS
Sore Throat: Care Instructions Your Care Instructions Infection by bacteria or a virus causes most sore throats. Cigarette smoke, dry air, air pollution, allergies, and yelling can also cause a sore throat. Sore throats can be painful and annoying. Fortunately, most sore throats go away on their own. If you have a bacterial infection, your doctor may prescribe antibiotics. Follow-up care is a key part of your treatment and safety. Be sure to make and go to all appointments, and call your doctor if you are having problems. It's also a good idea to know your test results and keep a list of the medicines you take. How can you care for yourself at home? · If your doctor prescribed antibiotics, take them as directed. Do not stop taking them just because you feel better. You need to take the full course of antibiotics. · Gargle with warm salt water once an hour to help reduce swelling and relieve discomfort. Use 1 teaspoon of salt mixed in 1 cup of warm water. · Take an over-the-counter pain medicine, such as acetaminophen (Tylenol), ibuprofen (Advil, Motrin), or naproxen (Aleve). Read and follow all instructions on the label. · Be careful when taking over-the-counter cold or flu medicines and Tylenol at the same time. Many of these medicines have acetaminophen, which is Tylenol. Read the labels to make sure that you are not taking more than the recommended dose. Too much acetaminophen (Tylenol) can be harmful. · Drink plenty of fluids. Fluids may help soothe an irritated throat. Hot fluids, such as tea or soup, may help decrease throat pain. · Use over-the-counter throat lozenges to soothe pain. Regular cough drops or hard candy may also help. These should not be given to young children because of the risk of choking. · Do not smoke or allow others to smoke around you. If you need help quitting, talk to your doctor about stop-smoking programs and medicines. These can increase your chances of quitting for good. · Use a vaporizer or humidifier to add moisture to your bedroom. Follow the directions for cleaning the machine. When should you call for help? Call your doctor now or seek immediate medical care if: 
  · You have new or worse trouble swallowing.  
  · Your sore throat gets much worse on one side.  
 Watch closely for changes in your health, and be sure to contact your doctor if you do not get better as expected. Where can you learn more? Go to http://charito-han.info/. Enter [phone] [id number] in the search box to learn more about \"Sore Throat: Care Instructions. \" Current as of: October 21, 2018 Content Version: 12.2 © 8109-1324 [employer]. Care instructions adapted under license by [employer] (which disclaims liability or warranty for this information). If you have questions about a medical condition or this instruction, always ask your healthcare professional. [place] [address] any warranty or liability for your use of this information.

## 2019-12-03 NOTE — PROGRESS NOTES
HISTORY OF PRESENT ILLNESS  Kath Wellington is a 34 y.o. male. HPI  Presents with complaints of persistent sore throat with voice hoarseness over the past 3 weeks. Reports symptoms began initially after he was screaming repeatedly at a basketball game and by the next morning, his throat was sore. Sore throat has persisted and worsens at times; overall has been feeling scratchy with some post nasal drainage but symptoms seem to worsen at night. Has been taking OTC Allergy medications and using Humidifier at home without improvement. Has taken several days of his father's Omeprazole thinking symptoms were caused by GERD but has not noted any improvement and he denies any symptoms of heartburn or reflux. 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 Denies sinus pressure or pain; denies fever, chills. Has been in contact with many people at sporting events. History of Mononucleosis in the past.    Reports his blood pressure elevates at medical offices and when he has checked this at local pharmacy, has always been in 110-120/70's range. Reports some tenderness to left anterior ribcage for the past several days. Began after his cousin \"cracked his back\" and he felt a pop in same area. Has been tender to palpation since then but denies shortness of breath or dyspnea. Has not noted any bruising to area.       Patient Active Problem List   Diagnosis Code    Seasonal allergic rhinitis J30.2    Mononucleosis B27.90    Ganglion cyst of flexor tendon sheath M67.40    Hypermetropia H52.00    Regular astigmatism H52.229    Keratoconus H18.609     Past Surgical History:   Procedure Laterality Date    HX TONSILLECTOMY       Social History     Socioeconomic History    Marital status: SINGLE     Spouse name: Not on file    Number of children: Not on file    Years of education: Not on file    Highest education level: Not on file   Occupational History    Occupation: Student     Social Needs    Financial resource strain: Not on file    Food insecurity:     Worry: Not on file     Inability: Not on file    Transportation needs:     Medical: Not on file     Non-medical: Not on file   Tobacco Use    Smoking status: Former Smoker    Smokeless tobacco: Never Used   Substance and Sexual Activity    Alcohol use: Not on file    Drug use: Not on file    Sexual activity: Not on file   Lifestyle    Physical activity:     Days per week: Not on file     Minutes per session: Not on file    Stress: Not on file   Relationships    Social connections:     Talks on phone: Not on file     Gets together: Not on file     Attends Yazdanism service: Not on file     Active member of club or organization: Not on file     Attends meetings of clubs or organizations: Not on file     Relationship status: Not on file    Intimate partner violence:     Fear of current or ex partner: Not on file     Emotionally abused: Not on file     Physically abused: Not on file     Forced sexual activity: Not on file   Other Topics Concern    Not on file   Social History Narrative    Not on file     Family History   Problem Relation Age of Onset    High Cholesterol Father      Current Outpatient Medications   Medication Sig    fexofenadine HCl (ALLEGRA PO) Take  by mouth.  azithromycin (ZITHROMAX) 250 mg tablet Take 1 Tab by mouth See Admin Instructions for 5 days.  predniSONE (DELTASONE) 20 mg tablet Take 2 tabs daily x 2 days then 1 tab daily x 2 days then 1/2 tab daily x 2 days    fluticasone (FLONASE) 50 mcg/actuation nasal spray 2 Sprays by Both Nostrils route daily.  ALPRAZolam (XANAX) 0.5 mg tablet Take 1 Tab by mouth nightly as needed for Anxiety. Max Daily Amount: 0.5 mg. Indications: Generalized Anxiety Disorder     No current facility-administered medications for this visit.       No Known Allergies  Immunization History   Administered Date(s) Administered    HIB Vaccine 1992, 01/19/1993, 09/23/1993  Hepatitis B Vaccine 04/06/1998, 11/05/1998, 11/06/2007    Influenza Vaccine 11/11/2015, 09/26/2016    MMR Vaccine 09/23/1993, 10/03/1996    Meningococcal Vaccine 07/17/2010    OPV 10/03/1996    TD Vaccine 1992, 1992, 01/19/1993, 10/03/1996    TDAP Vaccine 07/27/2010       Review of Systems   Constitutional: Negative for chills, fever and malaise/fatigue. HENT: Positive for congestion and sore throat. Negative for sinus pain. Respiratory: Negative for cough and shortness of breath. Cardiovascular: Positive for chest pain. Negative for palpitations. Gastrointestinal: Negative for abdominal pain, heartburn, nausea and vomiting. Genitourinary: Negative for dysuria and frequency. Musculoskeletal: Negative for myalgias. Neurological: Negative for tingling and headaches. /84 (BP 1 Location: Left arm, BP Patient Position: Sitting) Comment: manual cuff  Pulse 91   Temp 98.5 °F (36.9 °C) (Oral)   Resp 12   Ht 5' 9\" (1.753 m)   Wt 159 lb (72.1 kg)   SpO2 99%   BMI 23.48 kg/m²   Physical Exam  Vitals signs and nursing note reviewed. Constitutional:       Appearance: Normal appearance. He is normal weight. HENT:      Head: Normocephalic and atraumatic. Right Ear: Tympanic membrane, ear canal and external ear normal.      Left Ear: Tympanic membrane, ear canal and external ear normal.      Nose: Nose normal.      Mouth/Throat:      Mouth: Mucous membranes are moist.      Pharynx: Posterior oropharyngeal erythema present. Neck:      Musculoskeletal: Neck supple. Cardiovascular:      Rate and Rhythm: Normal rate and regular rhythm. Heart sounds: Normal heart sounds. Pulmonary:      Effort: Pulmonary effort is normal.      Breath sounds: Normal breath sounds. Chest:      Chest wall: Tenderness present. Comments: Tenderness to intercostal spaces left anterior lower ribcage  Abdominal:      General: Abdomen is flat.  Bowel sounds are normal. Tenderness: There is no tenderness. Lymphadenopathy:      Cervical: No cervical adenopathy. Skin:     General: Skin is warm and dry. Findings: No bruising. Neurological:      General: No focal deficit present. Mental Status: He is alert and oriented to person, place, and time. Psychiatric:         Mood and Affect: Mood normal.         Behavior: Behavior normal.         ASSESSMENT and PLAN  Diagnoses and all orders for this visit:    1. Sore throat -- rapid strep negative in office; will send out throat culture and have him start Zpack if positive. -     AMB POC RAPID STREP A -- negative  -     azithromycin (ZITHROMAX) 250 mg tablet; Take 1 Tab by mouth See Admin Instructions for 5 days. -     UPPER RESPIRATORY CULTURE    2. Hoarseness of voice  -     predniSONE (DELTASONE) 20 mg tablet; Take 2 tabs daily x 2 days then 1 tab daily x 2 days then 1/2 tab daily x 2 days    3. Elevated blood pressure reading without diagnosis of hypertension -- has been normal outside of medical offices    4. Chest wall pain -- appears MSK with tenderness to intercostal spaces; can take OTC NSAIDs for symptoms.       lab results and schedule of future lab studies reviewed with patient  reviewed diet, exercise and weight control  reviewed medications and side effects in detail

## 2019-12-07 LAB — BACTERIA SPEC RESP CULT: NORMAL

## 2021-06-04 NOTE — PATIENT INSTRUCTIONS
Eustachian Tube Problems: Care Instructions  Your Care Instructions    The eustachian (say \"you-STAY-shee-un\") tubes run between the inside of the ears and the throat. They keep air pressure stable in the ears. If your eustachian tubes become blocked, the air pressure in your ears changes. The fluids from a cold can clog eustachian tubes, causing pain in the ears. A quick change in air pressure can cause eustachian tubes to close up. This might happen when an airplane changes altitude or when a  goes up or down underwater. Eustachian tube problems often clear up on their own or after antibiotic treatment. If your tubes continue to be blocked, you may need surgery. Follow-up care is a key part of your treatment and safety. Be sure to make and go to all appointments, and call your doctor if you are having problems. It's also a good idea to know your test results and keep a list of the medicines you take. How can you care for yourself at home? · To ease ear pain, apply a warm washcloth or a heating pad set on low. There may be some drainage from the ear when the heat melts earwax. Put a cloth between the heat source and your skin. Do not use a heating pad with children. · If your doctor prescribed antibiotics, take them as directed. Do not stop taking them just because you feel better. You need to take the full course of antibiotics. · Your doctor may recommend over-the-counter medicine. Be safe with medicines. Oral or nasal decongestants may relieve ear pain. Avoid decongestants that are combined with antihistamines, which tend to cause more blockage. But if allergies seem to be the problem, your doctor may recommend a combination. Be careful with cough and cold medicines. Don't give them to children younger than 6, because they don't work for children that age and can even be harmful. For children 6 and older, always follow all the instructions carefully.  Make sure you know how much medicine to give and how long to use it. And use the dosing device if one is included. When should you call for help? Call your doctor now or seek immediate medical care if:  ? · You develop sudden, complete hearing loss. ? · You have severe pain or feel dizzy. ? · You have new or increasing pus or blood draining from your ear. ? · You have redness, swelling, or pain around or behind the ear. ? Watch closely for changes in your health, and be sure to contact your doctor if:  ? · You do not get better after 2 weeks. ? · You have any new symptoms, such as itching or a feeling of fullness in the ear. Where can you learn more? Go to http://charito-han.info/. Enter Y822 in the search box to learn more about \"Eustachian Tube Problems: Care Instructions. \"  Current as of: May 12, 2017  Content Version: 11.4  © 2973-4702 [employer]. Care instructions adapted under license by [employer] (which disclaims liability or warranty for this information). If you have questions about a medical condition or this instruction, always ask your healthcare professional. [address] any warranty or liability for your use of this information. Negative

## 2021-12-23 NOTE — TELEPHONE ENCOUNTER
Patient is wanting to speak with Dr Alea Mora regarding his Xanax  His no is [phone]
Pt would like a callback
lmtrc
none

## 2022-03-18 PROBLEM — H52.00 HYPERMETROPIA: Status: ACTIVE | Noted: 2017-03-06

## 2022-03-18 PROBLEM — H52.229 REGULAR ASTIGMATISM: Status: ACTIVE | Noted: 2017-03-06

## 2022-03-19 PROBLEM — H18.609 KERATOCONUS: Status: ACTIVE | Noted: 2018-01-01

## 2022-11-30 ENCOUNTER — TELEPHONE (OUTPATIENT)
Dept: INTERNAL MEDICINE CLINIC | Age: 32
End: 2022-11-30

## 2022-11-30 NOTE — TELEPHONE ENCOUNTER
----- Message from Radames Bonner sent at 11/30/2022  2:23 PM EST -----  Subject: Appointment Request    Reason for Call: Established Patient Appointment needed: Routine Existing   Condition Follow Up    QUESTIONS    Reason for appointment request? No appointments available during search     Additional Information for Provider? PT is experiencing slight dizziness,   headache, thinks its due to his anxiety but wanted to have his BP checked   as well. Please review and contact PT back with    recommendations/availability.  Thank you!  ---------------------------------------------------------------------------  --------------  James Rios INFO  [phone]; OK to leave message on voicemail  ---------------------------------------------------------------------------  --------------  SCRIPT ANSWERS  SUSANID Screen: Levar Quezada

## 2022-12-01 ENCOUNTER — OFFICE VISIT (OUTPATIENT)
Dept: INTERNAL MEDICINE CLINIC | Age: 32
End: 2022-12-01

## 2022-12-01 VITALS
DIASTOLIC BLOOD PRESSURE: 86 MMHG | HEART RATE: 85 BPM | WEIGHT: 164.8 LBS | HEIGHT: 70 IN | BODY MASS INDEX: 23.59 KG/M2 | SYSTOLIC BLOOD PRESSURE: 136 MMHG | OXYGEN SATURATION: 99 % | RESPIRATION RATE: 16 BRPM

## 2022-12-01 DIAGNOSIS — R03.0 ELEVATED BLOOD PRESSURE READING IN OFFICE WITHOUT DIAGNOSIS OF HYPERTENSION: ICD-10-CM

## 2022-12-01 DIAGNOSIS — F41.9 ANXIETY: Primary | ICD-10-CM

## 2022-12-01 RX ORDER — ALPRAZOLAM 0.5 MG/1
0.5 TABLET ORAL
Qty: 30 TABLET | Refills: 0 | Status: SHIPPED | OUTPATIENT
Start: 2022-12-01

## 2022-12-01 RX ORDER — PROPRANOLOL HYDROCHLORIDE 20 MG/1
20 TABLET ORAL
Qty: 30 TABLET | Refills: 0 | Status: SHIPPED | OUTPATIENT
Start: 2022-12-01

## 2022-12-01 RX ORDER — BISMUTH SUBSALICYLATE 262 MG
1 TABLET,CHEWABLE ORAL DAILY
COMMUNITY

## 2022-12-01 RX ORDER — EPINEPHRINE 0.3 MG/.3ML
INJECTION, SOLUTION INTRAMUSCULAR
COMMUNITY
Start: 2022-10-10

## 2022-12-01 NOTE — PROGRESS NOTES
HISTORY OF PRESENT ILLNESS    Chief Complaint   Patient presents with    Dizziness     Have been feeling \"off\"/unwell for the past 13 days - dizzy feeling - numbness in hands - falling sensation. Anxiety       Presents for follow-up    Last seen 2019. 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 Has a home and owns a dog. Past history of anxiety after graduating from college in 2016. Manifested as vertigo and chest pain in 2016 and 2018. Reports s/s for 13 days. He was at a work lunch and felt \"out of sorts\" w hand and facial paresthesia, palpitations. Went home and felt ok later after sleeping and the next day. Then, 5 days later, around noon, he was at a friends home and felt a sudden sensation of falling, paesthesia, feeling out of it. Since then he feels a mild s/s of intermittent paresthesia,  feels heart breat in neck and in right eye. Went to ride  bike, and walked dog and felt great 2 days ago  No hx of HTN. Hx keratoconus. Denies  Vision changes. Hx vestibular migraine. Stress re: friends dad who  in hospice. Discussions w his wife about if they want to have kids and they are in counseling again. Admits he is drinking more often since covid started, then cut back recently  He cut back on caffeine. Started personal counseling in the past month     History of vertigo as above. Saw ENT.  2018 CT  IMPRESSION:   1. Thinning of bone over the superior semicircular canals. Possible dehiscence  on the right. 2. Left tympanostomy tube in place. 3. Otherwise normal CT temporal bones    Review of Systems   All other systems reviewed and are negative, except as noted in HPI    Past Medical and Surgical History   has a past medical history of Ganglion cyst of flexor tendon sheath (2012), Keratoconus (2018), Mononucleosis (2011), Seasonal allergic rhinitis, and Vestibular migraine (2018).    has a past surgical history that includes hx tonsillectomy. reports that he has quit smoking. He has never used smokeless tobacco.  family history includes High Cholesterol in his father. Physical Exam   Nursing note and vitals reviewed. Blood pressure (!) 157/85, pulse 85, resp. rate 16, height 5' 10.28\" (1.785 m), weight 164 lb 12.8 oz (74.8 kg), SpO2 99 %. Constitutional:  No distress. Eyes: Conjunctivae are normal.   Ears:  Hearing grossly intact  Cardiovascular: Normal rate. regular rhythm, no murmurs or gallops  No edema  Pulmonary/Chest: Effort normal.   CTAB  Musculoskeletal: moves all 4 extremities   Neurological: Alert and oriented to person, place, and time. Skin: No visible rash noted. Psychiatric: Normal mood and affect. Behavior is normal.     Assessment and Plan    Diagnoses and all orders for this visit:    1. Anxiety  Significant new onset of anxiety with physical manifestations. I think he could benefit from a trial of a beta-blocker as needed as well as refilled alprazolam to take sparingly. No alcohol with alprazolam.  Continue personal counseling and relationship  Potential medication side effects were discussed with the patient; let me know if any occur. Discussed deep breathing exercises, yoga, exercise. Avoid caffeine and cold medicines. Reviewed concept of anxiety as biochemical imbalance of neurotransmitters and rationale for treatment. Instructed patient to contact office or on-call physician promptly should condition worsen or any new symptoms appear and provided on-call telephone numbers. IF THE PATIENT HAS ANY SUICIDAL OR HOMICIDAL IDEATION, CALL THE OFFICE, DISCUSS WITH A SUPPORT MEMBER OR GO TO THE ER IMMEDIATELY. Patient was agreeable with this plan. -     ALPRAZolam (XANAX) 0.5 mg tablet; Take 1 Tablet by mouth three (3) times daily as needed for Anxiety.  Max Daily Amount: 1.5 mg. DO NOT TAKE ALCOHOL WITHIN THIS MED  Indications: repeated episodes of anxiety   profile was accessed online for Silva Jacobo and reviewed by me during this encounter. I did not see evidence of inappropriate or suspicious controlled substance prescription activity. ,p  -     propranoloL (INDERAL) 20 mg tablet; Take 1 Tablet by mouth three (3) times daily as needed (palpitations, anxiety). 2. Elevated blood pressure reading in office without diagnosis of hypertension  Blood pressure is elevated in the office on triage. Improved with resting a little bit. Will monitor. Anxiety is contributing. lab results and schedule of future lab studies reviewed with patient  reviewed medications and side effects in detail    Return to clinic for further evaluation if new symptoms develop      Current Outpatient Medications   Medication Sig    Auvi-Q 0.3 mg/0.3 mL injection INJECT AS NEEDED FOR SEVERE ALLERGIC REACTION INCLUDING ANAPHYLAXIS AS DIRECTED INTRAMUSCULARLY INTO LATERAL THIGH MAY REPEAT IN 15-20 MINUT    multivitamin (ONE A DAY) tablet Take 1 Tablet by mouth daily. ALPRAZolam (XANAX) 0.5 mg tablet Take 1 Tablet by mouth three (3) times daily as needed for Anxiety. Max Daily Amount: 1.5 mg. DO NOT TAKE ALCOHOL WITHIN THIS MED  Indications: repeated episodes of anxiety    propranoloL (INDERAL) 20 mg tablet Take 1 Tablet by mouth three (3) times daily as needed (palpitations, anxiety). fluticasone (FLONASE) 50 mcg/actuation nasal spray 2 Sprays by Both Nostrils route daily. No current facility-administered medications for this visit.

## 2022-12-04 PROBLEM — F41.9 ANXIETY: Status: ACTIVE | Noted: 2022-12-04

## 2023-05-16 RX ORDER — PROPRANOLOL HYDROCHLORIDE 20 MG/1
TABLET ORAL
Qty: 30 TABLET | Refills: 3 | Status: SHIPPED | OUTPATIENT
Start: 2023-05-16

## 2023-05-16 RX ORDER — ALPRAZOLAM 0.5 MG/1
0.5 TABLET ORAL 3 TIMES DAILY PRN
COMMUNITY
Start: 2022-12-01

## 2023-05-16 RX ORDER — EPINEPHRINE 0.3 MG/.3ML
INJECTION SUBCUTANEOUS
COMMUNITY
Start: 2022-10-10

## 2023-05-16 RX ORDER — PROPRANOLOL HYDROCHLORIDE 20 MG/1
20 TABLET ORAL 3 TIMES DAILY PRN
COMMUNITY
Start: 2022-12-01

## 2023-05-16 RX ORDER — FLUTICASONE PROPIONATE 50 MCG
2 SPRAY, SUSPENSION (ML) NASAL DAILY
COMMUNITY
Start: 2015-02-09

## 2023-08-28 ENCOUNTER — OFFICE VISIT (OUTPATIENT)
Age: 33
End: 2023-08-28
Payer: COMMERCIAL

## 2023-08-28 VITALS
BODY MASS INDEX: 24.25 KG/M2 | SYSTOLIC BLOOD PRESSURE: 135 MMHG | OXYGEN SATURATION: 99 % | DIASTOLIC BLOOD PRESSURE: 89 MMHG | WEIGHT: 169.4 LBS | HEART RATE: 91 BPM | HEIGHT: 70 IN | RESPIRATION RATE: 20 BRPM | TEMPERATURE: 98.2 F

## 2023-08-28 DIAGNOSIS — F41.9 ANXIETY: ICD-10-CM

## 2023-08-28 DIAGNOSIS — Z00.00 PREVENTATIVE HEALTH CARE: ICD-10-CM

## 2023-08-28 DIAGNOSIS — Z11.59 NEED FOR HEPATITIS C SCREENING TEST: ICD-10-CM

## 2023-08-28 DIAGNOSIS — Z00.00 PREVENTATIVE HEALTH CARE: Primary | ICD-10-CM

## 2023-08-28 PROBLEM — H52.00 HYPERMETROPIA: Status: RESOLVED | Noted: 2017-03-06 | Resolved: 2023-08-28

## 2023-08-28 PROCEDURE — 99395 PREV VISIT EST AGE 18-39: CPT | Performed by: INTERNAL MEDICINE

## 2023-08-28 SDOH — ECONOMIC STABILITY: FOOD INSECURITY: WITHIN THE PAST 12 MONTHS, THE FOOD YOU BOUGHT JUST DIDN'T LAST AND YOU DIDN'T HAVE MONEY TO GET MORE.: NEVER TRUE

## 2023-08-28 SDOH — ECONOMIC STABILITY: INCOME INSECURITY: HOW HARD IS IT FOR YOU TO PAY FOR THE VERY BASICS LIKE FOOD, HOUSING, MEDICAL CARE, AND HEATING?: NOT HARD AT ALL

## 2023-08-28 SDOH — ECONOMIC STABILITY: FOOD INSECURITY: WITHIN THE PAST 12 MONTHS, YOU WORRIED THAT YOUR FOOD WOULD RUN OUT BEFORE YOU GOT MONEY TO BUY MORE.: NEVER TRUE

## 2023-08-28 SDOH — ECONOMIC STABILITY: HOUSING INSECURITY
IN THE LAST 12 MONTHS, WAS THERE A TIME WHEN YOU DID NOT HAVE A STEADY PLACE TO SLEEP OR SLEPT IN A SHELTER (INCLUDING NOW)?: NO

## 2023-08-28 ASSESSMENT — PATIENT HEALTH QUESTIONNAIRE - PHQ9
SUM OF ALL RESPONSES TO PHQ QUESTIONS 1-9: 0
2. FEELING DOWN, DEPRESSED OR HOPELESS: 0
SUM OF ALL RESPONSES TO PHQ QUESTIONS 1-9: 0
1. LITTLE INTEREST OR PLEASURE IN DOING THINGS: 0
SUM OF ALL RESPONSES TO PHQ QUESTIONS 1-9: 0
SUM OF ALL RESPONSES TO PHQ QUESTIONS 1-9: 0
SUM OF ALL RESPONSES TO PHQ9 QUESTIONS 1 & 2: 0

## 2023-08-29 NOTE — PROGRESS NOTES
significant lesions. Neck supple and free of adenopathy, or masses. No thyromegaly or carotid bruits. Cranial nerves normal. Lungs are clear to auscultation. Heart sounds are normal with no murmurs, clicks, gallops or rubs. Abdomen is soft, non- tender, with no masses or organomegaly. Extremities, peripheral pulses and reflexes are normal.  . .   Skin is without rashes or suspicious lesions. Assessment and Plan:    1. Preventative health care  Healthy 72-year-old male. He is an excellent candidate for adoption and I completed his form. Labs. Recommend seasonal influenza vaccine and COVID-19 booster this fall. Otherwise up-to-date.  - CBC; Future  - Lipid Panel; Future  - Comprehensive Metabolic Panel; Future  - Urinalysis; Future    2. Need for hepatitis C screening test  Generalized screening.  - Hepatitis C Antibody; Future    3. Anxiety  Does have a history of anxiety, primarily in social situations, but it is well controlled with no impairment of function whatsoever.   Continue propranolol as needed and rare use of alprazolam as needed      The patient is asked to make an attempt to improve diet and exercise patterns  Avoid tobacco products, excessive alcohol    Return for yearly wellness visits

## 2023-08-30 LAB
ALBUMIN SERPL-MCNC: 4.9 G/DL (ref 4.1–5.1)
ALBUMIN/GLOB SERPL: 2.2 {RATIO} (ref 1.2–2.2)
ALP SERPL-CCNC: 72 IU/L (ref 44–121)
ALT SERPL-CCNC: 19 IU/L (ref 0–44)
APPEARANCE UR: CLEAR
AST SERPL-CCNC: 18 IU/L (ref 0–40)
BILIRUB SERPL-MCNC: 0.3 MG/DL (ref 0–1.2)
BILIRUB UR QL STRIP: NEGATIVE
BUN SERPL-MCNC: 10 MG/DL (ref 6–20)
BUN/CREAT SERPL: 11 (ref 9–20)
CALCIUM SERPL-MCNC: 9.6 MG/DL (ref 8.7–10.2)
CHLORIDE SERPL-SCNC: 102 MMOL/L (ref 96–106)
CHOLEST SERPL-MCNC: 211 MG/DL (ref 100–199)
CO2 SERPL-SCNC: 21 MMOL/L (ref 20–29)
COLOR UR: YELLOW
CREAT SERPL-MCNC: 0.9 MG/DL (ref 0.76–1.27)
EGFRCR SERPLBLD CKD-EPI 2021: 117 ML/MIN/1.73
ERYTHROCYTE [DISTWIDTH] IN BLOOD BY AUTOMATED COUNT: 12.6 % (ref 11.6–15.4)
GLOBULIN SER CALC-MCNC: 2.2 G/DL (ref 1.5–4.5)
GLUCOSE SERPL-MCNC: 100 MG/DL (ref 70–99)
GLUCOSE UR QL STRIP: NEGATIVE
HCT VFR BLD AUTO: 46.4 % (ref 37.5–51)
HCV IGG SERPL QL IA: NON REACTIVE
HDLC SERPL-MCNC: 56 MG/DL
HGB BLD-MCNC: 15.2 G/DL (ref 13–17.7)
HGB UR QL STRIP: NEGATIVE
IMP & REVIEW OF LAB RESULTS: NORMAL
KETONES UR QL STRIP: NEGATIVE
LDLC SERPL CALC-MCNC: 134 MG/DL (ref 0–99)
LEUKOCYTE ESTERASE UR QL STRIP: NEGATIVE
MCH RBC QN AUTO: 30.6 PG (ref 26.6–33)
MCHC RBC AUTO-ENTMCNC: 32.8 G/DL (ref 31.5–35.7)
MCV RBC AUTO: 94 FL (ref 79–97)
NITRITE UR QL STRIP: NEGATIVE
PH UR STRIP: 8 [PH] (ref 5–7.5)
PLATELET # BLD AUTO: 231 X10E3/UL (ref 150–450)
POTASSIUM SERPL-SCNC: 4.4 MMOL/L (ref 3.5–5.2)
PROT SERPL-MCNC: 7.1 G/DL (ref 6–8.5)
PROT UR QL STRIP: NEGATIVE
RBC # BLD AUTO: 4.96 X10E6/UL (ref 4.14–5.8)
SODIUM SERPL-SCNC: 139 MMOL/L (ref 134–144)
SP GR UR STRIP: 1.01 (ref 1–1.03)
TRIGL SERPL-MCNC: 116 MG/DL (ref 0–149)
UROBILINOGEN UR STRIP-MCNC: 0.2 MG/DL (ref 0.2–1)
VLDLC SERPL CALC-MCNC: 21 MG/DL (ref 5–40)
WBC # BLD AUTO: 4.8 X10E3/UL (ref 3.4–10.8)

## 2023-09-25 ENCOUNTER — PATIENT MESSAGE (OUTPATIENT)
Age: 33
End: 2023-09-25

## 2023-09-25 DIAGNOSIS — F41.9 ANXIETY: Primary | ICD-10-CM

## 2023-09-25 RX ORDER — ALPRAZOLAM 0.5 MG/1
0.5 TABLET ORAL 3 TIMES DAILY PRN
Qty: 30 TABLET | Refills: 0 | Status: SHIPPED | OUTPATIENT
Start: 2023-09-25 | End: 2023-10-25

## 2023-09-25 NOTE — TELEPHONE ENCOUNTER
From: Hamida Chester  To: Dr. Kaleigh Cabrera: 9/25/2023 8:38 AM EDT  Subject: Alprazolam Refill    I was hoping I could get the alprazolam refilled but the marek won't let me.make that request. Let me know if you need anymore information.      Thanks

## 2023-12-13 ENCOUNTER — OFFICE VISIT (OUTPATIENT)
Age: 33
End: 2023-12-13
Payer: COMMERCIAL

## 2023-12-13 VITALS
TEMPERATURE: 98.2 F | HEART RATE: 82 BPM | DIASTOLIC BLOOD PRESSURE: 89 MMHG | WEIGHT: 177.4 LBS | SYSTOLIC BLOOD PRESSURE: 129 MMHG | OXYGEN SATURATION: 97 % | RESPIRATION RATE: 14 BRPM | BODY MASS INDEX: 25.4 KG/M2 | HEIGHT: 70 IN

## 2023-12-13 DIAGNOSIS — L72.3 SEBACEOUS CYST: Primary | ICD-10-CM

## 2023-12-13 PROCEDURE — 99213 OFFICE O/P EST LOW 20 MIN: CPT | Performed by: INTERNAL MEDICINE

## 2023-12-13 RX ORDER — DOXYCYCLINE HYCLATE 100 MG
100 TABLET ORAL 2 TIMES DAILY
Qty: 20 TABLET | Refills: 0 | Status: SHIPPED | OUTPATIENT
Start: 2023-12-13 | End: 2023-12-23

## 2023-12-13 RX ORDER — CEPHALEXIN 500 MG/1
500 CAPSULE ORAL 4 TIMES DAILY
Qty: 28 CAPSULE | Refills: 0 | Status: SHIPPED | OUTPATIENT
Start: 2023-12-13

## 2023-12-13 ASSESSMENT — PATIENT HEALTH QUESTIONNAIRE - PHQ9
1. LITTLE INTEREST OR PLEASURE IN DOING THINGS: 0
SUM OF ALL RESPONSES TO PHQ QUESTIONS 1-9: 0

## 2023-12-13 NOTE — PROGRESS NOTES
-    Neck: [x] No visualized mass [] Abnormal -     Pulmonary/Chest: [x] Respiratory effort normal   [x] No visualized signs of difficulty breathing or respiratory distress        [] Abnormal -      Musculoskeletal:   [x] Normal gait with no signs of ataxia         [x] Normal range of motion of neck        [] Abnormal -     Neurological:        [x] No Facial Asymmetry (Cranial nerve 7 motor function) (limited exam due to video visit)          [x] No gaze palsy        [] Abnormal -          Skin:        [x] No significant exanthematous lesions or discoloration noted on facial skin         [] Abnormal - 1.5 x 2 cm mobile cyst, no drainage,            Psychiatric:       [x] Normal Affect [] Abnormal -        [x] No Hallucinations      This medical record was transcribed using an electronic medical records/speech recognition system. Although proofread, it may and can contain electronic, spelling and other errors. Corrections may be executed at a later time. Please contact us for any clarifications as needed. On this date 12/13/23  I have spent 20 minutes reviewing previous notes, test results and face to face with the patient discussing the diagnosis and importance of compliance with the treatment plan as well as documenting on the day of the visit.

## 2024-06-04 RX ORDER — PROPRANOLOL HYDROCHLORIDE 20 MG/1
20 TABLET ORAL 3 TIMES DAILY PRN
Qty: 30 TABLET | Refills: 0 | Status: SHIPPED | OUTPATIENT
Start: 2024-06-04

## 2024-06-04 NOTE — TELEPHONE ENCOUNTER
Msg placed on rx sig to remind pt to call to make an appt to prevent a delay in future refill requests   Voddlert message also sent